# Patient Record
Sex: FEMALE | Race: WHITE | URBAN - METROPOLITAN AREA
[De-identification: names, ages, dates, MRNs, and addresses within clinical notes are randomized per-mention and may not be internally consistent; named-entity substitution may affect disease eponyms.]

---

## 2020-11-09 ENCOUNTER — OFFICE VISIT (OUTPATIENT)
Dept: OBGYN CLINIC | Facility: CLINIC | Age: 50
End: 2020-11-09
Payer: COMMERCIAL

## 2020-11-09 VITALS
SYSTOLIC BLOOD PRESSURE: 124 MMHG | HEIGHT: 60 IN | BODY MASS INDEX: 26.27 KG/M2 | DIASTOLIC BLOOD PRESSURE: 62 MMHG | WEIGHT: 133.81 LBS

## 2020-11-09 DIAGNOSIS — N92.1 MENORRHAGIA WITH IRREGULAR CYCLE: ICD-10-CM

## 2020-11-09 DIAGNOSIS — Z01.419 WELL WOMAN EXAM WITH ROUTINE GYNECOLOGICAL EXAM: Primary | ICD-10-CM

## 2020-11-09 DIAGNOSIS — Z12.31 SCREENING MAMMOGRAM, ENCOUNTER FOR: ICD-10-CM

## 2020-11-09 PROCEDURE — 99203 OFFICE O/P NEW LOW 30 MIN: CPT | Performed by: OBSTETRICS & GYNECOLOGY

## 2020-11-09 PROCEDURE — 3074F SYST BP LT 130 MM HG: CPT | Performed by: OBSTETRICS & GYNECOLOGY

## 2020-11-09 PROCEDURE — 3078F DIAST BP <80 MM HG: CPT | Performed by: OBSTETRICS & GYNECOLOGY

## 2020-11-09 PROCEDURE — 3008F BODY MASS INDEX DOCD: CPT | Performed by: OBSTETRICS & GYNECOLOGY

## 2020-11-09 RX ORDER — MV,CALCIUM,MIN/IRON/FOLIC ACID 18-0.4-6MG
TABLET ORAL
COMMUNITY

## 2020-11-09 RX ORDER — MELATONIN
1 DAILY
COMMUNITY

## 2020-11-09 RX ORDER — NORETHINDRONE ACETATE AND ETHINYL ESTRADIOL .03; 1.5 MG/1; MG/1
TABLET ORAL
COMMUNITY

## 2020-11-09 NOTE — PROGRESS NOTES
HPI:    Patient ID: Ritika Johnson is a 45year old female. Patient reports heavy irregular bleeding for the last year in spite of being on Junel 1.5/30 that was prescribed by PCP. Also reports lower abdominal tenderness.   Discussed getting a Pelvic U/ Psychiatric: She has a normal mood and affect. Her behavior is normal. Judgment and thought content normal.   Nursing note and vitals reviewed.              ASSESSMENT/PLAN:   Well woman exam with routine gynecological exam  (primary encounter diagnosis)

## 2020-11-19 ENCOUNTER — TELEPHONE (OUTPATIENT)
Dept: OBGYN CLINIC | Facility: CLINIC | Age: 50
End: 2020-11-19

## 2020-11-19 NOTE — TELEPHONE ENCOUNTER
She does not need to repeat her pap smear if her PCP confirmed that she does not need a pap until next year.

## 2020-11-19 NOTE — TELEPHONE ENCOUNTER
Informed pt of unsatisfactory results. Pt voices she saw her PCP the next day and that she said she wasn't due for a pap until next year. Does pt need to repeat her pap?  Pt also wanted Dr. Del Rio Sender to know that she had a CBC done through her PCP and her CB

## 2020-11-19 NOTE — TELEPHONE ENCOUNTER
----- Message from Cyrus Devries MD sent at 11/18/2020  9:45 AM CST -----  Pap read as Unsatisfactory. Patient needs to repeat pap smear when she is not on her menses. Call patient.

## 2020-11-25 ENCOUNTER — HOSPITAL ENCOUNTER (OUTPATIENT)
Dept: ULTRASOUND IMAGING | Age: 50
Discharge: HOME | End: 2020-11-25
Attending: OBSTETRICS & GYNECOLOGY
Payer: COMMERCIAL

## 2020-11-25 DIAGNOSIS — N92.1 MENORRHAGIA WITH IRREGULAR CYCLE: ICD-10-CM

## 2020-11-25 PROCEDURE — 76830 TRANSVAGINAL US NON-OB: CPT | Performed by: OBSTETRICS & GYNECOLOGY

## 2020-11-25 PROCEDURE — 76856 US EXAM PELVIC COMPLETE: CPT | Performed by: OBSTETRICS & GYNECOLOGY

## 2020-12-02 ENCOUNTER — TELEPHONE (OUTPATIENT)
Dept: OBGYN CLINIC | Facility: CLINIC | Age: 50
End: 2020-12-02

## 2020-12-02 NOTE — TELEPHONE ENCOUNTER
Patient needs to schedule an endometrial biopsy. Preferring to have it done at the [facility] but willing to go elsewhere. Please advise.

## 2020-12-04 ENCOUNTER — TELEPHONE (OUTPATIENT)
Dept: OBGYN CLINIC | Facility: CLINIC | Age: 50
End: 2020-12-04

## 2020-12-15 ENCOUNTER — OFFICE VISIT (OUTPATIENT)
Dept: OBGYN CLINIC | Facility: CLINIC | Age: 50
End: 2020-12-15
Payer: COMMERCIAL

## 2020-12-15 VITALS
SYSTOLIC BLOOD PRESSURE: 132 MMHG | BODY MASS INDEX: 26 KG/M2 | DIASTOLIC BLOOD PRESSURE: 85 MMHG | WEIGHT: 135.63 LBS | HEART RATE: 77 BPM

## 2020-12-15 DIAGNOSIS — N92.6 IRREGULAR MENSES: Primary | ICD-10-CM

## 2020-12-15 DIAGNOSIS — R87.615 UNSATISFACTORY CERVICAL PAPANICOLAOU SMEAR: ICD-10-CM

## 2020-12-15 DIAGNOSIS — N92.0 EXCESSIVE OR FREQUENT MENSTRUATION: ICD-10-CM

## 2020-12-15 PROCEDURE — 58100 BIOPSY OF UTERUS LINING: CPT | Performed by: OBSTETRICS & GYNECOLOGY

## 2020-12-15 PROCEDURE — 3079F DIAST BP 80-89 MM HG: CPT | Performed by: OBSTETRICS & GYNECOLOGY

## 2020-12-15 PROCEDURE — 3075F SYST BP GE 130 - 139MM HG: CPT | Performed by: OBSTETRICS & GYNECOLOGY

## 2020-12-15 NOTE — PROCEDURES
Endometrial Biopsy    Pre-Procedure Care:   Consent was obtained. Procedure/risks were explained. Questions were answered. Correct patient was identified. Correct side and site were confirmed. Pregnancy Results: negative from urine test   Pap Done.

## 2020-12-19 ENCOUNTER — TELEPHONE (OUTPATIENT)
Dept: OBGYN CLINIC | Facility: CLINIC | Age: 50
End: 2020-12-19

## 2020-12-22 NOTE — TELEPHONE ENCOUNTER
Pt Name and  verified. Called pt and offered her an apt with [facility]. Pt would like to wait and has changed her mind. No further questions.

## 2020-12-22 NOTE — TELEPHONE ENCOUNTER
Patient is interested in getting procedure to remove fibroid, if possible by the end of year. 11-noon will be at the eye doctor. Please advise.

## 2024-10-22 ENCOUNTER — OFFICE VISIT (OUTPATIENT)
Dept: OBGYN CLINIC | Facility: CLINIC | Age: 54
End: 2024-10-22
Payer: COMMERCIAL

## 2024-10-22 VITALS
DIASTOLIC BLOOD PRESSURE: 81 MMHG | BODY MASS INDEX: 26 KG/M2 | SYSTOLIC BLOOD PRESSURE: 135 MMHG | WEIGHT: 135 LBS | HEART RATE: 55 BPM

## 2024-10-22 DIAGNOSIS — Z01.411 ENCOUNTER FOR GYNECOLOGICAL EXAMINATION WITH ABNORMAL FINDING: Primary | ICD-10-CM

## 2024-10-22 DIAGNOSIS — N95.0 POST-MENOPAUSAL BLEEDING: ICD-10-CM

## 2024-10-22 DIAGNOSIS — Z12.4 SCREENING FOR MALIGNANT NEOPLASM OF CERVIX: ICD-10-CM

## 2024-10-22 PROCEDURE — 99202 OFFICE O/P NEW SF 15 MIN: CPT | Performed by: OBSTETRICS & GYNECOLOGY

## 2024-10-22 PROCEDURE — 99386 PREV VISIT NEW AGE 40-64: CPT | Performed by: OBSTETRICS & GYNECOLOGY

## 2024-10-22 PROCEDURE — 3075F SYST BP GE 130 - 139MM HG: CPT | Performed by: OBSTETRICS & GYNECOLOGY

## 2024-10-22 PROCEDURE — 3079F DIAST BP 80-89 MM HG: CPT | Performed by: OBSTETRICS & GYNECOLOGY

## 2024-10-22 RX ORDER — FLUTICASONE PROPIONATE 50 MCG
SPRAY, SUSPENSION (ML) NASAL DAILY
COMMUNITY

## 2024-10-22 RX ORDER — CETIRIZINE HYDROCHLORIDE 5 MG/1
5 TABLET ORAL DAILY
COMMUNITY

## 2024-10-23 PROBLEM — N95.0 POST-MENOPAUSAL BLEEDING: Status: ACTIVE | Noted: 2024-10-23

## 2024-10-23 LAB — HPV E6+E7 MRNA CVX QL NAA+PROBE: NEGATIVE

## 2024-10-23 NOTE — PROGRESS NOTES
Subjective:   Patient ID: Madeleine Bojorquez is a 54 year old female.    HPI   with last menstrual period 2023. She was amenorrheic until bleeding again beginning  x 3 days , usually after wiping post urination. ROS significant for the aforementioned bleeding, occasional JOSE ALBERTO w/o pad use and finally some axial arthralgias. Only new medical issue is HTN medication beginning in . She is UTD on mammography through [facility]/ [place] in 2024. Here PCP is through [facility].     History/Other:   Review of Systems   Constitutional:  Negative for appetite change, fatigue and unexpected weight change.   Eyes:  Negative for visual disturbance.   Respiratory:  Negative for cough and shortness of breath.    Cardiovascular:  Negative for chest pain, palpitations and leg swelling.   Gastrointestinal:  Negative for abdominal distention, abdominal pain, blood in stool, constipation and diarrhea.   Genitourinary:  Positive for vaginal bleeding. Negative for dyspareunia, dysuria, frequency, menstrual problem, pelvic pain and urgency.   Musculoskeletal:  Positive for arthralgias. Negative for myalgias.   Skin:  Negative for rash.   Neurological:  Negative for weakness, numbness and headaches.   Psychiatric/Behavioral:  Negative for dysphoric mood. The patient is not nervous/anxious.      Current Outpatient Medications   Medication Sig Dispense Refill    fluticasone propionate 50 MCG/ACT Nasal Suspension by Each Nare route daily.      cetirizine 5 MG Oral Tab Take 1 tablet (5 mg total) by mouth daily.      Naphazoline-Pheniramine 0.025-0.3 % Ophthalmic Solution 1 drop.       Allergies:Allergies[1]    Objective:   Physical Exam  Constitutional:       General: She is not in acute distress.     Appearance: She is well-developed. She is not diaphoretic.   Neck:      Thyroid: No thyromegaly.   Cardiovascular:      Rate and Rhythm: Normal rate and regular rhythm.      Heart sounds: Normal  heart sounds. No murmur heard.  Pulmonary:      Effort: Pulmonary effort is normal.      Breath sounds: Normal breath sounds. No wheezing or rales.   Chest:   Breasts:     Right: Normal. No mass, nipple discharge, skin change or tenderness.      Left: Normal. No mass, nipple discharge, skin change or tenderness.      Comments:     Abdominal:      General: There is no distension.      Palpations: Abdomen is soft. There is no mass.      Tenderness: There is no abdominal tenderness. There is no guarding or rebound.   Genitourinary:     Labia:         Right: No rash or lesion.         Left: No rash or lesion.       Vagina: Normal. No vaginal discharge.      Cervix: No cervical motion tenderness or discharge.      Uterus: Not enlarged and not tender.       Adnexa:         Right: No mass, tenderness or fullness.          Left: No mass, tenderness or fullness.     Musculoskeletal:         General: No tenderness.      Cervical back: Neck supple.   Lymphadenopathy:      Cervical: No cervical adenopathy.      Upper Body:      Right upper body: No supraclavicular, axillary or pectoral adenopathy.      Left upper body: No supraclavicular, axillary or pectoral adenopathy.   Neurological:      Mental Status: She is alert.         Assessment & Plan:   1. Encounter for gynecological examination with abnormal finding    2. Post-menopausal bleeding    3. Screening for malignant neoplasm of cervix        Orders Placed This Encounter   Procedures    Hpv Dna  High Risk , Thin Prep Collect    ThinPrep PAP Smear    THINPREP PAP SMEAR ONLY     PLAN: In review of chart, there was a ? Endometrial mass seen on ultrasound in 2020. Rather than go straight to office EB, we'll get ultrasound. If there truly is a mass, then plan hysteroscopic excision / D and C.   Meds This Visit:  Requested Prescriptions      No prescriptions requested or ordered in this encounter       Imaging & Referrals:  US PELVIS W EV (CPT=76856/97225)         [1]    Allergies  Allergen Reactions    Penicillins UNKNOWN     Unable to state side effects

## 2024-11-06 ENCOUNTER — HOSPITAL ENCOUNTER (OUTPATIENT)
Dept: ULTRASOUND IMAGING | Age: 54
Discharge: HOME | End: 2024-11-06
Attending: OBSTETRICS & GYNECOLOGY
Payer: COMMERCIAL

## 2024-11-06 DIAGNOSIS — N95.0 POST-MENOPAUSAL BLEEDING: ICD-10-CM

## 2024-11-06 PROCEDURE — 76830 TRANSVAGINAL US NON-OB: CPT | Performed by: OBSTETRICS & GYNECOLOGY

## 2024-11-06 PROCEDURE — 76856 US EXAM PELVIC COMPLETE: CPT | Performed by: OBSTETRICS & GYNECOLOGY

## (undated) NOTE — LETTER
AUTHORIZATION FOR SURGICAL OPERATION OR OTHER PROCEDURE    1.  I hereby authorize Cherelle Nation, and [facility] staff assigned to my case to perform the following operation and/or procedure at the [facility]: [facility]  _______________________ Patient signature:  ___________________________________________________             Relationship to Patient:           []  Parent    Responsible person                          []  Spouse  In case of minor or                    [] Other  _____________   In

## (undated) NOTE — LETTER
12/21/2020              [facility]        [address]        [place] [address]         Dear Sienna Harrison,    It was a pleasure to see you. Your PAP test was normal.  There is no need for further testing at this time.   I look forward to seeing you at yo